# Patient Record
(demographics unavailable — no encounter records)

---

## 2025-05-02 NOTE — HISTORY OF PRESENT ILLNESS
[FreeTextEntry1] : new pt here for establish care [de-identified] : 39F PMHx DAVID, Vit D deficiency, Morbid Obe (s/p bariatric surgery 13 years ago), Anx/Depression/OCD.  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

## 2025-05-02 NOTE — PROCEDURE
[de-identified] : INJECTION / ASPIRATION BILATERAL KNEES  Patient has demonstrated limited relief from NSAIDS, rest, exercises / PT , and after discussion of the risks and benefits, the patient has elected to proceed with a n ULTRASOUND GUIDED corticosteroid injection into the BILATERAL SupeLat PF Knee   Confirmed that the patient does not have history of prior adverse reactions, active, infections, or relevant allergies. There was no effusion, erythema, or warmth, and the skin was clear  The skin was sterilized with alcohol. Ethyl Chloride was used as a topical anesthetic. Routine sterile technique.    The KNEE JOINT  was injected utilizing ULTRASOUND GUIDANCEwith KENALOG + LIDOCAINE. The injection was completed without complication and a bandage was applied.  The patient tolerated the procedure well and was given post-injection instructions.Rec: Cold therapy, analgesics, avoid heavy activity.  MEDICATION: Lidocaine 1% 4cc + 10mg of Kenalog LOT# [id number] EXP MARCH 2026  ASPIRATE EFFUSION LEFT 36CC  RIGHT  CLEAR YELLOW

## 2025-05-02 NOTE — HEALTH RISK ASSESSMENT
[Never] : Never [No] : In the past 12 months have you used drugs other than those required for medical reasons? No [Patient reported PAP Smear was normal] : Patient reported PAP Smear was normal [Significant Other] : lives with significant other [Sexually Active] : sexually active [de-identified] : pt stated not at the moment she does take medication for anxiety and depression. [PapSmearDate] : 2021 - - -

## 2025-05-02 NOTE — PHYSICAL EXAM
[de-identified] : PHYSICAL EXAM BILATERAL KNEES  SWELLING EFFUSION LEFT KNEE AROM RIGHT  0- 100 LEFT    0-100  SPECIAL TESTS  PATELLAR GRIND = GRIND  DRAWER  = NEG LACHMAN = NEG MACMURRAY = NEG   MOTOR = GROSSLY INTACT SENSORY = GROSSLY INTACT    [de-identified] :  [id number] EXAM: XR KNEE COMP 4+ VIEWS BI ORDERED BY: SAMANTHA HELLERMAN PROCEDURE DATE: 05/01/2025  Bilateral knee pain.  TECHNIQUE: 4 views right knee; 5 views left knee.  FINDINGS/ IMPRESSION: There is no acute fracture. No dislocation. Bilateral mild to moderate tricompartmental knee joint osteoarthrosis consisting of joint space narrowing and marginal osteophyte formation. Findings are most pronounced in the medial compartment on the right and left. Small right and large left knee effusions.

## 2025-05-02 NOTE — HISTORY OF PRESENT ILLNESS
[de-identified] : REASON: BILATERAL KNEE PAIN DURATION: PAIN STARTED-DECEMBER 2024  FEW MONTHS - NO SPECIFIC INJURY  DESCRIPTION OF PAIN: SHARP, ACHY, THROBBING, SHOOTING PAIN LEVEL: 5/10 CONSTANT  - MUCH WORSE ON STAIRS  PRIOR TREATMENTS:REST, ICY HOT, MEDICATION AGGRAVATING FACTORS: WALKING, BENDING, USING THE STAIRS, TWISITNG SYMPTOMS: STIFFNESS/ SWELLING ASSOCIATED POPPING / CLICKING / LOCKING / INSTABILITY       HAS HAD PHYSICAL THERAPY -  NO HAS HAD PREVIOUS SURGERY- NO HAS HAD PREVIOUS INJECTION -  NO HAS HAD PREVIOUS IMAGING - YES

## 2025-05-02 NOTE — PHYSICAL EXAM
[Normal] : affect was normal and insight and judgment were intact [de-identified] : B/l knee not TTP, ROM intact b/l. Strength, sensation intact

## 2025-05-02 NOTE — ASSESSMENT
[Vaccines Reviewed] : Immunizations reviewed today. Please see immunization details in the vaccine log within the immunization flowsheet.  [FreeTextEntry1] : 39F PMHx DAVID, Vit D deficiency, Morbid Obe (s/p bariatric surgery 13 years ago), Anx/Depression/OCD.  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 Ortho referral given, appt today.  - Ortho referral - Tylenol PRN  #Morbid obseity 280 lb prior to bariatric surgery 13 years ago, 200 pounds after bariatric surg, currently 230 pounds.   #DAVID  Prior iron supplementation M/W/F gave GI side effects, abd pain and constipation. Pt dc'ed med. Consider another formulary of iron v.s. heme consult for IV iron if these studies show DAVID.  - CBC, Iron studies  #Vit D deficiency Pt not taking meds.  - Vit D level today  #Macromastia - Plastic surg referral as per pt request for evaluation of breast reduction surgery  #Anxiety/Depression #OCD Follows with psychiatrist.  -C/w home Adderall 20 BID, Sertraline 150 Qd, Propanolol 20 BID  #HCM - A1C, Lipid  - GYn referral, pt due for pap - Defers STI screening, pt monogamous with female partner for 2 years Does not smoke or drink. Takes THC gummies every 2-3 days. Got childhood vaccines and covid vaccine. Pap smear WNL 2021, due for pap.

## 2025-05-02 NOTE — DISCUSSION/SUMMARY
[de-identified] : XRAYS REVEAL KNEE OSTEOARTHRITIS LITTLE RELIEF FROM NSAIDS , EXERCISES PATIENT HAS ELECTED TO PROCEED WITH KENALOG INJECTION KNEE  RISKS AND BENEFITS DISCUSSED - VERBAL CONSENT OBTAINED SEE PROCEDURE NOTE     POST INJECTION INSTRUCTIONS:   INJECTION THERAPY HANDOUT PROVIDED   COLD THERAPY , ANALGESICS PRN   HOME STRETCHING AND EXERCISES QD  -  KNEE OA HANDOUT ELASTIC KNEE SUPPORT PRN ARCH SUPPORTS OR SUPPORTIVE SHOES WITH ARCH SUPPORT   MONOVISC  ORDERED